# Patient Record
Sex: FEMALE | Race: WHITE
[De-identification: names, ages, dates, MRNs, and addresses within clinical notes are randomized per-mention and may not be internally consistent; named-entity substitution may affect disease eponyms.]

---

## 2017-12-31 ENCOUNTER — HOSPITAL ENCOUNTER (INPATIENT)
Dept: HOSPITAL 14 - H.EROB2 | Age: 27
LOS: 2 days | Discharge: HOME | DRG: 373 | End: 2018-01-02
Attending: OBSTETRICS & GYNECOLOGY | Admitting: OBSTETRICS & GYNECOLOGY
Payer: COMMERCIAL

## 2017-12-31 VITALS — RESPIRATION RATE: 20 BRPM | OXYGEN SATURATION: 100 %

## 2017-12-31 VITALS — BODY MASS INDEX: 30.6 KG/M2

## 2017-12-31 DIAGNOSIS — O99.820: ICD-10-CM

## 2017-12-31 DIAGNOSIS — Z3A.39: ICD-10-CM

## 2017-12-31 DIAGNOSIS — Z23: ICD-10-CM

## 2017-12-31 LAB
BASOPHILS # BLD AUTO: 0 K/UL (ref 0–0.2)
BASOPHILS NFR BLD: 0.5 % (ref 0–2)
EOSINOPHIL # BLD AUTO: 0.1 K/UL (ref 0–0.7)
EOSINOPHIL NFR BLD: 1.6 % (ref 0–4)
ERYTHROCYTE [DISTWIDTH] IN BLOOD BY AUTOMATED COUNT: 25.4 % (ref 11.5–14.5)
HGB BLD-MCNC: 10.6 G/DL (ref 12–16)
LYMPHOCYTES # BLD AUTO: 2.1 K/UL (ref 1–4.3)
LYMPHOCYTES NFR BLD AUTO: 29.5 % (ref 20–40)
MCH RBC QN AUTO: 25.9 PG (ref 27–31)
MCHC RBC AUTO-ENTMCNC: 32.6 G/DL (ref 33–37)
MCV RBC AUTO: 79.4 FL (ref 81–99)
MONOCYTES # BLD: 0.6 K/UL (ref 0–0.8)
MONOCYTES NFR BLD: 8.9 % (ref 0–10)
NEUTROPHILS # BLD: 4.2 K/UL (ref 1.8–7)
NEUTROPHILS NFR BLD AUTO: 59.5 % (ref 50–75)
NRBC BLD AUTO-RTO: 0 % (ref 0–0)
PLATELET # BLD: 174 K/UL (ref 130–400)
PMV BLD AUTO: 9.3 FL (ref 7.2–11.7)
RBC # BLD AUTO: 4.11 MIL/UL (ref 3.8–5.2)
WBC # BLD AUTO: 7 K/UL (ref 4.8–10.8)

## 2017-12-31 PROCEDURE — 0HQ9XZZ REPAIR PERINEUM SKIN, EXTERNAL APPROACH: ICD-10-PCS | Performed by: OBSTETRICS & GYNECOLOGY

## 2017-12-31 PROCEDURE — 3E0234Z INTRODUCTION OF SERUM, TOXOID AND VACCINE INTO MUSCLE, PERCUTANEOUS APPROACH: ICD-10-PCS | Performed by: OBSTETRICS & GYNECOLOGY

## 2017-12-31 PROCEDURE — 10907ZC DRAINAGE OF AMNIOTIC FLUID, THERAPEUTIC FROM PRODUCTS OF CONCEPTION, VIA NATURAL OR ARTIFICIAL OPENING: ICD-10-PCS | Performed by: OBSTETRICS & GYNECOLOGY

## 2017-12-31 PROCEDURE — 4A1HXCZ MONITORING OF PRODUCTS OF CONCEPTION, CARDIAC RATE, EXTERNAL APPROACH: ICD-10-PCS | Performed by: OBSTETRICS & GYNECOLOGY

## 2017-12-31 RX ADMIN — Medication PRN SPRAYS: at 21:53

## 2017-12-31 NOTE — OBDS
===================================

LABOR SUMMARY

===================================

   

EDC:  01/06/2018 00:00

No. Babies in Womb:  1

   

===================================

LABOR INFORMATION

===================================

   

Group B Beta Strep:  Positive

   

===================================

MEMBRANES

===================================

   

Membranes Rupture Method:  Artificial

Amniotic Fluid Color:  Clear

Amniotic Fluid Amount:  Small

## 2018-01-01 LAB
BASOPHILS # BLD AUTO: 0 K/UL (ref 0–0.2)
BASOPHILS NFR BLD: 0.3 % (ref 0–2)
EOSINOPHIL # BLD AUTO: 0.1 K/UL (ref 0–0.7)
EOSINOPHIL NFR BLD: 1.1 % (ref 0–4)
ERYTHROCYTE [DISTWIDTH] IN BLOOD BY AUTOMATED COUNT: 26 % (ref 11.5–14.5)
HGB BLD-MCNC: 10.3 G/DL (ref 12–16)
LYMPHOCYTES # BLD AUTO: 2.4 K/UL (ref 1–4.3)
LYMPHOCYTES NFR BLD AUTO: 24.5 % (ref 20–40)
MCH RBC QN AUTO: 26.4 PG (ref 27–31)
MCHC RBC AUTO-ENTMCNC: 33.1 G/DL (ref 33–37)
MCV RBC AUTO: 79.8 FL (ref 81–99)
MONOCYTES # BLD: 0.7 K/UL (ref 0–0.8)
MONOCYTES NFR BLD: 7.2 % (ref 0–10)
NEUTROPHILS # BLD: 6.6 K/UL (ref 1.8–7)
NEUTROPHILS NFR BLD AUTO: 66.9 % (ref 50–75)
NRBC BLD AUTO-RTO: 0 % (ref 0–0)
PLATELET # BLD: 175 K/UL (ref 130–400)
PMV BLD AUTO: 9.4 FL (ref 7.2–11.7)
RBC # BLD AUTO: 3.89 MIL/UL (ref 3.8–5.2)
WBC # BLD AUTO: 9.9 K/UL (ref 4.8–10.8)

## 2018-01-01 RX ADMIN — Medication PRN SPRAYS: at 08:43

## 2018-01-01 NOTE — OBPPN
===========================

Datetime: 2018 05:58

===========================

   

PP Pain Prov:  Within normal limits

PP Nausea Prov:  Denies

PP Flatus Prov:  Yes

PP BM Prov:  Yes

PP Heart Prov:  Normal

PP Lungs Prov:  Normal

PP Abdomen/Uterus Prov:  Normal

PP Lochia Prov:  Normal

PP CVA Tenderness Prov:  Normal

PP Extremities Prov:  Normal

PP C/S Incision Prov:  Not Applicable

PP Breastfeeding Progress Prov:  Normal

PP Impression Prov:  Normal postpartum progression

PP Plan Prov:  Continue present management

PP Progress Note Prov:  28 y/o F now  on PPD 1. Pt had a  on 17 and a 1st degree perin
eal laceration. No acute events overnight. Pelvic pain is mild and well controlled with medications. 
Pt tolerating PO, ambulating and voiding with no difficulties. Lochia is less than menses. Pt is watson
stfeeding only. Pt passing gasses and had a bowel movement this morning. Pt denies fever, headache, v
isual disturbances, CP, SOB, N/V or pruritus.

      

   PE

   Gen: Pt resting comfortably on bed, AAOx3, not in acute distress.

   Lungs: CTA B/L. No W/R/R.

   CV: S1 S2 present, regular rhythm.

   Abd: BS+, soft, fundus of uterus firm and at level of umbilicus. 

   Ext: no edema, neg Kelsey's sign, non-tender calves. 

   NEURO/PSYCH: no grossly focal deficit, preserved affect and mood.

      

   A/P: 28 y/o F on PPD 1, recovering well from .

   -Continue medical management.

   -Encourage breastfeeding and ambulation.

   Luigi Lawson PGY-1.

      

   OB Hospitalist Addendum:  Pt seen and examined by me.  Agree w/ above.  PPD 1 s/p , doing well,
 breast feeding.   (ES)  

IP PP Procedures:  None

Vital Signs Provider PP:  Reviewed

## 2018-01-02 VITALS — TEMPERATURE: 97.3 F | HEART RATE: 75 BPM | DIASTOLIC BLOOD PRESSURE: 66 MMHG | SYSTOLIC BLOOD PRESSURE: 117 MMHG

## 2018-01-02 NOTE — OBPPN
===========================

Datetime: 2018 08:30

===========================

   

PP Pain Prov:  Within normal limits

PP Nausea Prov:  Denies

PP Flatus Prov:  Yes

PP BM Prov:  Yes

PP Breasts Prov:  Not Done

PP Heart Prov:  Normal

PP Lungs Prov:  Normal

PP Abdomen/Uterus Prov:  Normal

PP Lochia Prov:  Normal

PP Vulva/Perineum Prov:  Not Done

PP CVA Tenderness Prov:  Normal

PP Extremities Prov:  Normal

PP C/S Incision Prov:  Not Applicable

PP Breastfeeding Progress Prov:  Normal

PP Impression Prov:  Normal postpartum progression

PP Plan Prov:  Discharge

PP Progress Note Prov:   PPD 2  

   S: 26 yo  s/p NVD on 17 at 10:24. Pt. is seen and examined at bedside this AM. No overni
ght events. Pt reports occasional abdominal pain, but well controlled with pain meds. Pt is ambulatin
g without any difficulties. Breast feeding baby. Tolerating PO diet. Lochia is similar to light mense
s in volume. Voiding freely, bowel movement, and passing gas per rectum. Denies fever, chills, diarrh
ea, nausea, vomiting, chest pain, dyspnea, and dizziness.  

   O:  

   VS: stable  

   GEN: NAD  

   Cardio: S1S2, no M/G/R  

   Resp: clear breath sounds b/l, no wheezing

   Abdomen: BS+, NT, Uterus is firm and at the level of the umbilicus.  

   EXT: No edema, calves nontender  

   NEURO/PSYCHI: AAOx3, no grossly focal deficit, preserved affect and mood.  

   Assessment/Plan: 26 yo  s/p NVD on 17 at 10:24. Pt remains afebrile, tolerating pain wit
h medication, tolerating PO intake, doing well on PPD2. OOB with caution  

   pt ambulating  

   Ibuprofen 600mg for pain.    

   Encourage breastfeeding and ambulating  

   F/u CBC post-delivery: 10.3/31.1  

   Anticipated d/c to home, 18.  

   --- Michi Carrillo MD PGY-1  

      

   [facility]...pt afshin nd examined on rounds this AM ... agree with PGY1 note

IP PP Procedures:  None

Vital Signs Provider PP:  Reviewed; Within Normal Limits

## 2022-07-13 NOTE — OBDCSUM
===========================

Datetime: 2018 09:03

===========================

   

Discharged to, Provider:  Home

Follow up at, Provider:  OB in 4-6 weeks for pp follow up

Disch Instr Activity:  Normal activity; May Shower

Disch Instr Diet:  Regular

Discharge Instructions, Provider:  Routine instructions given

Discharge Diagnosis, Provider:  Term Pregnancy Delivered

Discharge Time:  2018 10:00

Follow up in weeks, Provider:  4-6 weeks

Disch Referrals:  None

Contraception discussed, Prov:  Yes

Disch Activity Restrictions:  No exercising; No lifting; No driving; Minimize walking; No sexual acti
vity; Nothing in vagina - [place], tampons, douche

Discharge Comment, Provider:  DOA: 2017  

   EGA: 39.0 

   Diagnosis: NVD  

   Term  

   Risk factors: none  

   Summary of pregnancy: 26 yo   

   L_D summary  

   DOL: 2017 at 10:24  

   NVD  

   NB: M  

   APGAR:   

   Weight: 3595 g  

   PP summary  

   No serious complications during PP. Lochia= menses, mild pain, controlled with medications  

   Rubella immune 

   Blood type: A+ 

   CBC pp: 10.3/31.1 

   Discharge Date: 2018  

   Time 10:00 AM  

   Discharge Instructions:  

   -encourage breastfeeding  

   -Ibuprofen for pain PRN    

   -Ambulate as tolerated  

   -f/u NB visit 3-7 days w/ pediatrician and PP visit 4-6 weeks with OB 

   --- Michi Carrillo MD PGY-1  

Contraception after Delivery:  IUD
Left UE/Right UE/Left LE/Right LE/Grossly Intact